# Patient Record
Sex: MALE | Race: WHITE | HISPANIC OR LATINO | URBAN - METROPOLITAN AREA
[De-identification: names, ages, dates, MRNs, and addresses within clinical notes are randomized per-mention and may not be internally consistent; named-entity substitution may affect disease eponyms.]

---

## 2023-02-18 ENCOUNTER — EMERGENCY (EMERGENCY)
Facility: HOSPITAL | Age: 43
LOS: 1 days | Discharge: HOME | End: 2023-02-18
Attending: EMERGENCY MEDICINE
Payer: COMMERCIAL

## 2023-02-18 VITALS
WEIGHT: 160.06 LBS | OXYGEN SATURATION: 94 % | HEIGHT: 70 IN | HEART RATE: 110 BPM | TEMPERATURE: 98 F | RESPIRATION RATE: 20 BRPM | DIASTOLIC BLOOD PRESSURE: 80 MMHG | SYSTOLIC BLOOD PRESSURE: 138 MMHG

## 2023-02-18 VITALS
RESPIRATION RATE: 17 BRPM | SYSTOLIC BLOOD PRESSURE: 132 MMHG | HEART RATE: 91 BPM | TEMPERATURE: 98 F | DIASTOLIC BLOOD PRESSURE: 73 MMHG | OXYGEN SATURATION: 100 %

## 2023-02-18 LAB
ALBUMIN SERPL ELPH-MCNC: 4.9 G/DL — SIGNIFICANT CHANGE UP (ref 3.3–5)
ALP SERPL-CCNC: 147 U/L — HIGH (ref 40–120)
ALT FLD-CCNC: 48 U/L — HIGH (ref 10–45)
ANION GAP SERPL CALC-SCNC: 20 MMOL/L — HIGH (ref 5–17)
APPEARANCE UR: CLEAR — SIGNIFICANT CHANGE UP
APTT BLD: 29.9 SEC — SIGNIFICANT CHANGE UP (ref 27.5–35.5)
AST SERPL-CCNC: 36 U/L — SIGNIFICANT CHANGE UP (ref 10–40)
BACTERIA # UR AUTO: NEGATIVE — SIGNIFICANT CHANGE UP
BASE EXCESS BLDV CALC-SCNC: -6 MMOL/L — LOW (ref -2–3)
BASOPHILS # BLD AUTO: 0.07 K/UL — SIGNIFICANT CHANGE UP (ref 0–0.2)
BASOPHILS NFR BLD AUTO: 1 % — SIGNIFICANT CHANGE UP (ref 0–2)
BILIRUB SERPL-MCNC: 0.4 MG/DL — SIGNIFICANT CHANGE UP (ref 0.2–1.2)
BILIRUB UR-MCNC: NEGATIVE — SIGNIFICANT CHANGE UP
BUN SERPL-MCNC: 14 MG/DL — SIGNIFICANT CHANGE UP (ref 7–23)
CA-I SERPL-SCNC: 1.12 MMOL/L — LOW (ref 1.15–1.33)
CALCIUM SERPL-MCNC: 9.1 MG/DL — SIGNIFICANT CHANGE UP (ref 8.4–10.5)
CHLORIDE BLDV-SCNC: 106 MMOL/L — SIGNIFICANT CHANGE UP (ref 96–108)
CHLORIDE SERPL-SCNC: 102 MMOL/L — SIGNIFICANT CHANGE UP (ref 96–108)
CO2 BLDV-SCNC: 21 MMOL/L — LOW (ref 22–26)
CO2 SERPL-SCNC: 17 MMOL/L — LOW (ref 22–31)
COLOR SPEC: SIGNIFICANT CHANGE UP
CREAT SERPL-MCNC: 1.04 MG/DL — SIGNIFICANT CHANGE UP (ref 0.5–1.3)
DIFF PNL FLD: NEGATIVE — SIGNIFICANT CHANGE UP
EGFR: 93 ML/MIN/1.73M2 — SIGNIFICANT CHANGE UP
EOSINOPHIL # BLD AUTO: 0.04 K/UL — SIGNIFICANT CHANGE UP (ref 0–0.5)
EOSINOPHIL NFR BLD AUTO: 0.6 % — SIGNIFICANT CHANGE UP (ref 0–6)
EPI CELLS # UR: 0 /HPF — SIGNIFICANT CHANGE UP
ETHANOL SERPL-MCNC: 150 MG/DL — HIGH (ref 0–10)
GAS PNL BLDV: 137 MMOL/L — SIGNIFICANT CHANGE UP (ref 136–145)
GAS PNL BLDV: SIGNIFICANT CHANGE UP
GAS PNL BLDV: SIGNIFICANT CHANGE UP
GLUCOSE BLDV-MCNC: 105 MG/DL — HIGH (ref 70–99)
GLUCOSE SERPL-MCNC: 116 MG/DL — HIGH (ref 70–99)
GLUCOSE UR QL: NEGATIVE — SIGNIFICANT CHANGE UP
HCO3 BLDV-SCNC: 20 MMOL/L — LOW (ref 22–29)
HCT VFR BLD CALC: 47.7 % — SIGNIFICANT CHANGE UP (ref 39–50)
HCT VFR BLDA CALC: 45 % — SIGNIFICANT CHANGE UP (ref 39–51)
HGB BLD CALC-MCNC: 15 G/DL — SIGNIFICANT CHANGE UP (ref 12.6–17.4)
HGB BLD-MCNC: 15.9 G/DL — SIGNIFICANT CHANGE UP (ref 13–17)
HYALINE CASTS # UR AUTO: 0 /LPF — SIGNIFICANT CHANGE UP (ref 0–2)
IMM GRANULOCYTES NFR BLD AUTO: 1 % — HIGH (ref 0–0.9)
INR BLD: 0.98 RATIO — SIGNIFICANT CHANGE UP (ref 0.88–1.16)
KETONES UR-MCNC: ABNORMAL
LACTATE BLDV-MCNC: 2.8 MMOL/L — HIGH (ref 0.5–2)
LEUKOCYTE ESTERASE UR-ACNC: NEGATIVE — SIGNIFICANT CHANGE UP
LIDOCAIN IGE QN: 64 U/L — HIGH (ref 7–60)
LYMPHOCYTES # BLD AUTO: 1.94 K/UL — SIGNIFICANT CHANGE UP (ref 1–3.3)
LYMPHOCYTES # BLD AUTO: 27.1 % — SIGNIFICANT CHANGE UP (ref 13–44)
MCHC RBC-ENTMCNC: 30.6 PG — SIGNIFICANT CHANGE UP (ref 27–34)
MCHC RBC-ENTMCNC: 33.3 GM/DL — SIGNIFICANT CHANGE UP (ref 32–36)
MCV RBC AUTO: 91.9 FL — SIGNIFICANT CHANGE UP (ref 80–100)
MONOCYTES # BLD AUTO: 0.55 K/UL — SIGNIFICANT CHANGE UP (ref 0–0.9)
MONOCYTES NFR BLD AUTO: 7.7 % — SIGNIFICANT CHANGE UP (ref 2–14)
NEUTROPHILS # BLD AUTO: 4.5 K/UL — SIGNIFICANT CHANGE UP (ref 1.8–7.4)
NEUTROPHILS NFR BLD AUTO: 62.6 % — SIGNIFICANT CHANGE UP (ref 43–77)
NITRITE UR-MCNC: NEGATIVE — SIGNIFICANT CHANGE UP
NRBC # BLD: 0 /100 WBCS — SIGNIFICANT CHANGE UP (ref 0–0)
PCO2 BLDV: 38 MMHG — LOW (ref 42–55)
PH BLDV: 7.32 — SIGNIFICANT CHANGE UP (ref 7.32–7.43)
PH UR: 6.5 — SIGNIFICANT CHANGE UP (ref 5–8)
PLATELET # BLD AUTO: 342 K/UL — SIGNIFICANT CHANGE UP (ref 150–400)
PO2 BLDV: 70 MMHG — HIGH (ref 25–45)
POTASSIUM BLDV-SCNC: 3.9 MMOL/L — SIGNIFICANT CHANGE UP (ref 3.5–5.1)
POTASSIUM SERPL-MCNC: 3.3 MMOL/L — LOW (ref 3.5–5.3)
POTASSIUM SERPL-SCNC: 3.3 MMOL/L — LOW (ref 3.5–5.3)
PROT SERPL-MCNC: 8 G/DL — SIGNIFICANT CHANGE UP (ref 6–8.3)
PROT UR-MCNC: ABNORMAL
PROTHROM AB SERPL-ACNC: 11.4 SEC — SIGNIFICANT CHANGE UP (ref 10.5–13.4)
RBC # BLD: 5.19 M/UL — SIGNIFICANT CHANGE UP (ref 4.2–5.8)
RBC # FLD: 13.4 % — SIGNIFICANT CHANGE UP (ref 10.3–14.5)
RBC CASTS # UR COMP ASSIST: 1 /HPF — SIGNIFICANT CHANGE UP (ref 0–4)
SAO2 % BLDV: 93.8 % — HIGH (ref 67–88)
SODIUM SERPL-SCNC: 139 MMOL/L — SIGNIFICANT CHANGE UP (ref 135–145)
SP GR SPEC: 1.03 — HIGH (ref 1.01–1.02)
UROBILINOGEN FLD QL: NEGATIVE — SIGNIFICANT CHANGE UP
WBC # BLD: 7.17 K/UL — SIGNIFICANT CHANGE UP (ref 3.8–10.5)
WBC # FLD AUTO: 7.17 K/UL — SIGNIFICANT CHANGE UP (ref 3.8–10.5)
WBC UR QL: 0 /HPF — SIGNIFICANT CHANGE UP (ref 0–5)

## 2023-02-18 PROCEDURE — 82330 ASSAY OF CALCIUM: CPT

## 2023-02-18 PROCEDURE — 72125 CT NECK SPINE W/O DYE: CPT | Mod: MG

## 2023-02-18 PROCEDURE — 73562 X-RAY EXAM OF KNEE 3: CPT

## 2023-02-18 PROCEDURE — 82435 ASSAY OF BLOOD CHLORIDE: CPT

## 2023-02-18 PROCEDURE — 83605 ASSAY OF LACTIC ACID: CPT

## 2023-02-18 PROCEDURE — 72125 CT NECK SPINE W/O DYE: CPT | Mod: 26,MG

## 2023-02-18 PROCEDURE — 71045 X-RAY EXAM CHEST 1 VIEW: CPT | Mod: 26

## 2023-02-18 PROCEDURE — 71260 CT THORAX DX C+: CPT | Mod: MA

## 2023-02-18 PROCEDURE — 99285 EMERGENCY DEPT VISIT HI MDM: CPT | Mod: 25

## 2023-02-18 PROCEDURE — 29125 APPL SHORT ARM SPLINT STATIC: CPT

## 2023-02-18 PROCEDURE — 73110 X-RAY EXAM OF WRIST: CPT | Mod: 26,50

## 2023-02-18 PROCEDURE — 71260 CT THORAX DX C+: CPT | Mod: 26,MA

## 2023-02-18 PROCEDURE — 74177 CT ABD & PELVIS W/CONTRAST: CPT | Mod: MA

## 2023-02-18 PROCEDURE — G1004: CPT

## 2023-02-18 PROCEDURE — 85018 HEMOGLOBIN: CPT

## 2023-02-18 PROCEDURE — 82947 ASSAY GLUCOSE BLOOD QUANT: CPT

## 2023-02-18 PROCEDURE — 85610 PROTHROMBIN TIME: CPT

## 2023-02-18 PROCEDURE — 70450 CT HEAD/BRAIN W/O DYE: CPT | Mod: MG

## 2023-02-18 PROCEDURE — 90715 TDAP VACCINE 7 YRS/> IM: CPT

## 2023-02-18 PROCEDURE — 73110 X-RAY EXAM OF WRIST: CPT

## 2023-02-18 PROCEDURE — 70450 CT HEAD/BRAIN W/O DYE: CPT | Mod: 26,MG

## 2023-02-18 PROCEDURE — 84295 ASSAY OF SERUM SODIUM: CPT

## 2023-02-18 PROCEDURE — 82962 GLUCOSE BLOOD TEST: CPT

## 2023-02-18 PROCEDURE — 96361 HYDRATE IV INFUSION ADD-ON: CPT | Mod: XU

## 2023-02-18 PROCEDURE — 36415 COLL VENOUS BLD VENIPUNCTURE: CPT

## 2023-02-18 PROCEDURE — 81001 URINALYSIS AUTO W/SCOPE: CPT

## 2023-02-18 PROCEDURE — 73562 X-RAY EXAM OF KNEE 3: CPT | Mod: 26,50

## 2023-02-18 PROCEDURE — 74177 CT ABD & PELVIS W/CONTRAST: CPT | Mod: 26,MA

## 2023-02-18 PROCEDURE — 85014 HEMATOCRIT: CPT

## 2023-02-18 PROCEDURE — 96374 THER/PROPH/DIAG INJ IV PUSH: CPT | Mod: XU

## 2023-02-18 PROCEDURE — 90471 IMMUNIZATION ADMIN: CPT

## 2023-02-18 PROCEDURE — 71045 X-RAY EXAM CHEST 1 VIEW: CPT

## 2023-02-18 PROCEDURE — 85025 COMPLETE CBC W/AUTO DIFF WBC: CPT

## 2023-02-18 PROCEDURE — 80053 COMPREHEN METABOLIC PANEL: CPT

## 2023-02-18 PROCEDURE — 82803 BLOOD GASES ANY COMBINATION: CPT

## 2023-02-18 PROCEDURE — 84132 ASSAY OF SERUM POTASSIUM: CPT

## 2023-02-18 PROCEDURE — 83690 ASSAY OF LIPASE: CPT

## 2023-02-18 PROCEDURE — 80307 DRUG TEST PRSMV CHEM ANLYZR: CPT

## 2023-02-18 PROCEDURE — 99284 EMERGENCY DEPT VISIT MOD MDM: CPT | Mod: 25

## 2023-02-18 PROCEDURE — 85730 THROMBOPLASTIN TIME PARTIAL: CPT

## 2023-02-18 PROCEDURE — 99053 MED SERV 10PM-8AM 24 HR FAC: CPT

## 2023-02-18 RX ORDER — POTASSIUM CHLORIDE 20 MEQ
40 PACKET (EA) ORAL ONCE
Refills: 0 | Status: COMPLETED | OUTPATIENT
Start: 2023-02-18 | End: 2023-02-18

## 2023-02-18 RX ORDER — SODIUM CHLORIDE 9 MG/ML
1000 INJECTION, SOLUTION INTRAVENOUS ONCE
Refills: 0 | Status: COMPLETED | OUTPATIENT
Start: 2023-02-18 | End: 2023-02-18

## 2023-02-18 RX ORDER — KETOROLAC TROMETHAMINE 30 MG/ML
15 SYRINGE (ML) INJECTION ONCE
Refills: 0 | Status: DISCONTINUED | OUTPATIENT
Start: 2023-02-18 | End: 2023-02-18

## 2023-02-18 RX ORDER — TETANUS TOXOID, REDUCED DIPHTHERIA TOXOID AND ACELLULAR PERTUSSIS VACCINE, ADSORBED 5; 2.5; 8; 8; 2.5 [IU]/.5ML; [IU]/.5ML; UG/.5ML; UG/.5ML; UG/.5ML
0.5 SUSPENSION INTRAMUSCULAR ONCE
Refills: 0 | Status: COMPLETED | OUTPATIENT
Start: 2023-02-18 | End: 2023-02-18

## 2023-02-18 RX ORDER — ACETAMINOPHEN 500 MG
975 TABLET ORAL ONCE
Refills: 0 | Status: COMPLETED | OUTPATIENT
Start: 2023-02-18 | End: 2023-02-18

## 2023-02-18 RX ADMIN — Medication 15 MILLIGRAM(S): at 11:05

## 2023-02-18 RX ADMIN — TETANUS TOXOID, REDUCED DIPHTHERIA TOXOID AND ACELLULAR PERTUSSIS VACCINE, ADSORBED 0.5 MILLILITER(S): 5; 2.5; 8; 8; 2.5 SUSPENSION INTRAMUSCULAR at 11:10

## 2023-02-18 RX ADMIN — Medication 40 MILLIEQUIVALENT(S): at 07:51

## 2023-02-18 RX ADMIN — SODIUM CHLORIDE 1000 MILLILITER(S): 9 INJECTION, SOLUTION INTRAVENOUS at 08:45

## 2023-02-18 RX ADMIN — Medication 975 MILLIGRAM(S): at 05:57

## 2023-02-18 RX ADMIN — SODIUM CHLORIDE 1000 MILLILITER(S): 9 INJECTION, SOLUTION INTRAVENOUS at 07:51

## 2023-02-18 RX ADMIN — Medication 975 MILLIGRAM(S): at 07:18

## 2023-02-18 RX ADMIN — Medication 15 MILLIGRAM(S): at 08:58
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
## 2023-02-18 NOTE — ED PROVIDER NOTE - NSFOLLOWUPINSTRUCTIONS_ED_ALL_ED_FT
Seguimiento con la clínica de cirugía ortopédica en 7 a 10 días. Se le llamará para concertar leonard josemanuel. Si no recibe leonard llamada o tiene alguna dificultad para obtener aguillon josemanuel, llame al departamento de emergencias. El número de teléfono se puede encontrar en la parte superior izquierda de la documentación.    Mantenga aguillon férula seca.    Regrese al departamento de emergencias de inmediato si siente entumecimiento en los dedos o dolor en la vel del lado en el que está entablillado.    Regrese inmediatamente al Departamento de Emergencias por cualquier síntoma nuevo o que empeore marcadamente.    Alterna entre Tylenol e ibuprofeno. Drake 1 g de Tylenol, 4 horas después tome 600 mg de ibuprofeno, 4 horas después tome 1 g de Tylenol. Continúe alternando así según sea necesario para el dolor. Si no tiene dolor, no necesita bony maxwell medicamento.    Use ibuprofeno y tylenol de venta argelia para controlar aguillon dolor. Úselo solo según sea necesario. Iman atentamente las instrucciones en el envase del medicamento. Siga estas instrucciones cuando use estos medicamentos. Estos medicamentos pueden ser peligrosos cuando se usan incorrectamente. Las posibles consecuencias incluyen enfermedades hepáticas y renales. Estos medicamentos son muy seguros cuando se usan correctamente.    Follow-up with the orthopedic surgery clinic in 7 to 10 days.  He will be called to make an appointment.  If you do not get a call or have any difficulty obtaining your appointment, call the emergency department. The phone number can be found on the upper left-hand side of the paperwork.    Keep your splint dry.    Return to the emergency department immediately if you develop any numbness to your fingers or pain at the wrist on the side that you are splinted on.    Immediately return to the Emergency Department for any new or markedly worsening symptoms.    Alternate between Tylenol and Ibuprofen. Take 1 g of Tylenol, 4 hours later take 600 mg of Ibuprofen, 4 hours after this take 1 g of Tylenol. Continuing alternating like this as needed for pain. If you do not have pain, you do not need to take this medication.      Use over counter ibuprofen and tylenol for management of your pain. Use only as needed. Read the instructions on the medication container carefully. Follow these instructions when using these medications. These medications can be dangerous when used incorrectly. Possible consequences include liver and kidney disease. These medications are very safe when used correctly.

## 2023-02-18 NOTE — ED PROVIDER NOTE - NS ED ROS FT
GENERAL: No fever, no chills  EYES: No change in vision  HEENT: No trouble swallowing or speaking  CARDIAC: No chest pain  PULMONARY: No cough, no SOB  GI: No abdominal pain, no nausea, no vomiting, no diarrhea, no constipation  : No changes in urination  SKIN: No rashes  NEURO: No headache, no numbness  MSK: +joint pain  Otherwise as HPI or negative.

## 2023-02-18 NOTE — ED PROVIDER NOTE - PATIENT PORTAL LINK FT
You can access the FollowMyHealth Patient Portal offered by [facility] by registering at the following website: [email or web address]. By joining [facility]’s FollowMyHealth portal, you will also be able to view your health information using other applications (apps) compatible with our system.

## 2023-02-18 NOTE — ED ADULT NURSE NOTE - NSIMPLEMENTINTERV_GEN_ALL_ED
Implemented All Universal Safety Interventions:  [place] to call system. Call bell, personal items and telephone within reach. Instruct patient to call for assistance. Room bathroom lighting operational. Non-slip footwear when patient is off stretcher. Physically safe environment: no spills, clutter or unnecessary equipment. Stretcher in lowest position, wheels locked, appropriate side rails in place.

## 2023-02-18 NOTE — ED PROVIDER NOTE - CARE PLAN
Principal Discharge DX:	Abrasion   1 Principal Discharge DX:	Bilateral knee pain  Secondary Diagnosis:	Facial abrasion, initial encounter  Secondary Diagnosis:	Injury of wrist
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
## 2023-02-18 NOTE — ED PROVIDER NOTE - PHYSICAL EXAMINATION
Gen: NAD, AOx3, able to make needs known, non-toxic  HEENT: EOMI, oral mucosa moist, normal conjunctiva. No head trauma visualized, no cervical spine tenderness. +nasal bridge abrasion   CV: pulses bilaterally, no sternal tenderness, no clavicle tenderness. +right ribcage tenderness   Abd: soft, NTND, no guarding, no CVA tenderness   MSK: no visible bony deformities, no spinal tenderness, no tenderness with palpation of the bilateral UE, +posterior right knee, no tenderness of the LLE, +right hip pain, no pelvis tenderness. +bilateral snuffbox   Neuro: No focal sensory or motor deficits  Skin: Warm, well perfused, no rash. abrasion over right knee, blood over bilateral hands however no significant abrasions or lacerations noted   Psych: normal affect
